# Patient Record
Sex: MALE | Race: WHITE | NOT HISPANIC OR LATINO | Employment: PART TIME | URBAN - METROPOLITAN AREA
[De-identification: names, ages, dates, MRNs, and addresses within clinical notes are randomized per-mention and may not be internally consistent; named-entity substitution may affect disease eponyms.]

---

## 2023-07-01 ENCOUNTER — HOSPITAL ENCOUNTER (EMERGENCY)
Facility: CLINIC | Age: 18
Discharge: HOME | End: 2023-07-02
Attending: EMERGENCY MEDICINE | Admitting: EMERGENCY MEDICINE
Payer: COMMERCIAL

## 2023-07-01 DIAGNOSIS — T59.91XA INHALATION OF NOXIOUS FUMES, ACCIDENTAL OR UNINTENTIONAL, INITIAL ENCOUNTER: ICD-10-CM

## 2023-07-01 PROCEDURE — 99283 EMERGENCY DEPT VISIT LOW MDM: CPT

## 2023-07-01 PROCEDURE — 36415 COLL VENOUS BLD VENIPUNCTURE: CPT | Performed by: EMERGENCY MEDICINE

## 2023-07-01 PROCEDURE — 82375 ASSAY CARBOXYHB QUANT: CPT | Performed by: EMERGENCY MEDICINE

## 2023-07-02 VITALS
RESPIRATION RATE: 16 BRPM | TEMPERATURE: 99.3 F | OXYGEN SATURATION: 98 % | WEIGHT: 172 LBS | SYSTOLIC BLOOD PRESSURE: 111 MMHG | DIASTOLIC BLOOD PRESSURE: 69 MMHG | HEART RATE: 75 BPM

## 2023-07-02 LAB
COHGB MFR BLD: 1.1 % (ref 0–2)
HOLD SPECIMEN: NORMAL
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
## 2023-07-02 NOTE — ED NOTES
Patient walked with RN down the hallway with a steady, even gait. Patient denies dizziness or lightheadedness.

## 2023-07-02 NOTE — DISCHARGE INSTRUCTIONS
When to seek medical advice  Call your healthcare provider right away if any of these occur:   Headache, dizziness, or fainting  Confusion, drowsiness, or seizures  Cough with lots of mucus (sputum)  Fever of 100.4  F (38 C) or higher, or as directed by your provider  Call 911  Call 911 if either of these occur:   Shortness of breath or wheezing gets worse  Chest pain gets worse
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
## 2025-03-09 ENCOUNTER — HOSPITAL ENCOUNTER (EMERGENCY)
Facility: CLINIC | Age: 20
Discharge: HOME | End: 2025-03-09
Attending: EMERGENCY MEDICINE | Admitting: EMERGENCY MEDICINE
Payer: COMMERCIAL

## 2025-03-09 VITALS
HEART RATE: 82 BPM | SYSTOLIC BLOOD PRESSURE: 131 MMHG | WEIGHT: 170 LBS | OXYGEN SATURATION: 98 % | HEIGHT: 72 IN | RESPIRATION RATE: 18 BRPM | TEMPERATURE: 98 F | DIASTOLIC BLOOD PRESSURE: 51 MMHG | BODY MASS INDEX: 23.03 KG/M2

## 2025-03-09 DIAGNOSIS — Z65.8 INTERPERSONAL PROBLEM: ICD-10-CM

## 2025-03-09 PROBLEM — R45.88 NONSUICIDAL SELF-INJURY (H): Status: ACTIVE | Noted: 2025-03-09

## 2025-03-09 PROBLEM — Z63.8 FAMILY CONFLICT: Status: ACTIVE | Noted: 2025-03-09

## 2025-03-09 PROBLEM — F12.90 MARIJUANA USE: Status: ACTIVE | Noted: 2025-03-09

## 2025-03-09 PROCEDURE — 99283 EMERGENCY DEPT VISIT LOW MDM: CPT

## 2025-03-09 ASSESSMENT — ACTIVITIES OF DAILY LIVING (ADL)
ADLS_ACUITY_SCORE: 41

## 2025-03-09 ASSESSMENT — COLUMBIA-SUICIDE SEVERITY RATING SCALE - C-SSRS
2. HAVE YOU ACTUALLY HAD ANY THOUGHTS OF KILLING YOURSELF IN THE PAST MONTH?: NO
6. HAVE YOU EVER DONE ANYTHING, STARTED TO DO ANYTHING, OR PREPARED TO DO ANYTHING TO END YOUR LIFE?: NO
1. IN THE PAST MONTH, HAVE YOU WISHED YOU WERE DEAD OR WISHED YOU COULD GO TO SLEEP AND NOT WAKE UP?: NO

## 2025-03-09 NOTE — PLAN OF CARE
Ghanshyam Arenas  March 9, 2025  Plan of Care Hand-off Note     Patient Recommended Care Path: discharge    Clinical Substantiation:  After diagnostic assessment, chart review and collateral information, the recommendation of this writer is for discharge with safety plan.   Patient's current symptoms of anxiety and family conflict; can be managed effectively in an outpatient setting. At the time of discharge, patient did not present with any imminent risk factors that would indicate the need for inpatient psychiatric hospitalization. At this time mental health admission does not appear to be the most therapeutically beneficial intervention/ level of care for Ghanshyam Arenas.   Patient feels confident that he can engage with the referral made today for mental health support. Patient is educated on how to access urgent psychiatric care if their symptoms increase or worsen.     Ghanshyam was agreeable with the plan presented for discharge, and was able to engage in safety planning with this writer.    Goals for crisis stabilization:  Engage in Outpatient mental health support to develop more effective coping mechanisms and better manage family conflict.    Next steps for Care Team:  no next steps for [facility]    Treatment Objectives Addressed:  rapport building, safety planning, identifying an appropriate aftercare plan, assessing safety    Has a specific means been identified for suicidal.homicide actions: No    Patient coping skills attempted to reduce the crisis:  Patient is not able to identify any coping skills that he attempted to utilize to reduce the crisis.    Collateral contact information:  Sherif Arenas, (Mother)  [phone]    Legal Status: Voluntary/Patient has signed consent for treatment                            Reviewed court records: yes     Psychiatry Consult: no psychiatry order placed    LAUREN Arrieta, Psychotherapist  [facility] - Triage & Transition Services  Callback: [phone]

## 2025-03-09 NOTE — DISCHARGE INSTRUCTIONS
Transitional care clinic appointment as below.  Return as needed.      Patient Navigation Hub - Scheduled Appointment    You have been scheduled a telephone appointment with the Mental Health and Addiction Services Patient Navigation Hub. As a reminder, this is not an in-person appointment. A Navigator will contact you at your personal telephone number on 3/10 at 3PM. You can expect a 15-30 minute appointment. You will discuss programming options and be assisted in next steps. If you have any further questions or concerns, please contact the Navigation Hub at [phone]. Note: You will not be charged for this telephone appointment.    Our Navigators work to be your point-of-contact for trustworthy and compassionate care from Emergency Services to [facility] s Programmatic Care. We will provide resources and communication to help guide you into programmatic care, other internal resources (I.e., Transition Clinic), and/or community programs. Ultimately, our goal is to be the one-stop-shop of communication, coordination, and support for you.    Phone: [phone]  Email: [email or web address]  Fax: [phone]    [facility] Programmatic Care  The following is a list of our programming options that may fit your next steps:    Programs  Mental Health  Intensive Outpatient Program (IOP)  Partial Hospitalization Program (PHP)  Day Treatment  Substance Use Disorder  Intensive Outpatient Program  Outpatient Group  Mental Health & Substance Use Disorder  Co-Occurring Intensive Outpatient Program  Residential  Available Locations  [facility], [place], [place], [place]  Note: Specific program options vary by location.    Transition Clinic  After leaving Emergency Services, it may take up to 30 days to enter a program. Knowing support is important during this period of time, we offer an urgent model of mental health care via the Transition  Clinic. We encourage you to discuss this with your Navigator during your telephone appointment.    FAQ  What can I expect after leaving Emergency Services?  If not already, you will soon be contacted by a Navigator who will work with you to find a program that fits your needs. If you desire to enter one of our [facility] programs, you will enter our programmatic care intake where an assessment will likely be needed over telephone, virtually, or in-person. After this assessment, a Navigator will connect with you again to provide a handoff to the specific program for next steps.   Please note that it may take up to 30 days for you to begin a program. If an extended wait occurs, please consider services at the Transition Clinic.  What is programmatic care?  It is a highly structured and comprehensive approach designed to address mental health and substance use disorders.   Programmatic care is typically used when individuals have not responded well to less intensive treatments or when they require a higher level of intervention due to the severity of their condition. It can be found in various settings, such as an outpatient location, residential treatment facilities, or inpatient hospitals.  Each program varies in duration and weekly commitments. Ask a Navigator for more program details.
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
## 2025-03-09 NOTE — ED PROVIDER NOTES
Emergency Department Note      History of Present Illness     Chief Complaint   Psychiatric Evaluation      HPI   Ghanshyam Arenas is a 19 year old male who presents with his mother for a psychiatric evaluation.  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  He has had several instances where he gets into arguments with his parents.  He tells me he just wants the argument to end so he punches himself in the face and once this even caused him to chip his tooth.  Another time he dislocated his shoulder when gesticulating and this ultimately required surgery and inability to work. He denies suicidal ideation as well as homicidal ideation.  Today he got into another argument with his mother and she insisted he come to the emergency department for psychiatric evaluation.  He has used marijuana but his parents were drug testing him so he stopped.  He denies alcohol use and any other drug use.    His mother reports her concern is primarily his addiction to marijuana.  She reports he was sober after a treatment at [facility] but has recently relapsed.  She tells me he is not making good decisions because he is high and he is disrespectful to her, verbally abusing her.    Independent Historian   Mother as detailed above.    Review of External Notes   Internal medicine visit 2/10/2025 and pulmonology 2/13/2025 for asthma.  Orthopedics note 2/17/2025 for follow-up on right shoulder labrum repair 1/16/2025.  Nurse triage call today for shoulder pain.    Past Medical History     Medical History and Problem List   Asthma    Medications   Advair    Surgical History   Right shoulder labrum repair 1/16/2025    Physical Exam     Patient Vitals for the past 24 hrs:   BP Temp Temp src Pulse Resp SpO2 Height Weight   03/09/25 1458 131/51 98  F (36.7  C) Oral 82 18 98 % -- --   03/09/25 1427 -- 98.7  F (37.1  C) Temporal -- -- -- -- --  "  03/09/25 1425 115/53 -- -- 81 18 98 % 1.829 m (6') 77.1 kg (170 lb)     Physical Exam  General: Well-developed and well-nourished. Well appearing young man. Cooperative.  Head:  Atraumatic.  Eyes:  Conjunctivae, lids, and sclerae are normal.  ENT:    Normal nose. Moist mucous membranes.  Neck:  Supple. Normal range of motion.  CV:  Well-perfused.  Resp:  No respiratory distress.   GI:  Non-distended.    Skin:  Warm. Non-diaphoretic. No pallor.  Neuro:  Awake. A&Ox3. Normal strength.  Psych: Normal mood and affect.  Stuttering speech.  No suicidal or homicidal thought content.  Not responding to internal stimuli.  Vitals reviewed.    Diagnostics     Not applicable    ED Course    ED Course   ED Course as of 03/09/25 1744   Sun Mar 09, 2025   1654 I spoke with TERRENCE Ramos, after her assessment of the patient.    1712 I went to update the patient, but he had left.  His mom called him and put him on speaker phone.  He understands plan for appointment and is willing to let his mother take the discharge paperwork.     Additional Documentation  Social Determinants of Health: Employment/Unemployment(unemployed), Financial Insecurity(decreased income and recent loss of savings), Transportation(uses mother's car when she will allow), and Social Connections/Isolation(strained relationship with his parents)    Medical Decision Making / Diagnosis   MEG Morrissey is a 19 year old man who was brought in by his mother for a psychiatric evaluation.  Ghanshyam reports frequent arguments with his parents.  It sounds like this is typically over marijuana use.  He denies suicidal ideation, alcohol use, and any other drug use.  His mother is concerned given his \"relapse\" with marijuana use.  She tells me he is not making good decisions because he is high and he is disrespectful to her.  The patient is well-appearing on exam.  He has no suicidal thought content.  He does not appear to be intoxicated or in withdrawal.  There is no evidence of " psychosis.  He is here voluntarily.  The patient was seen by DEC who recommends discharge.  I agree with this completely and the patient is agreeable to a outpatient therapy appointment with the transitional care clinic.  His mother was updated on this plan as well.  He will return as needed.  Questions answered.    Disposition   The patient was discharged.     Diagnosis     ICD-10-CM    1. Interpersonal problem  Z65.8            Discharge Medications   There are no discharge medications for this patient.       Sarah Dasilva MD  03/11/25 7673

## 2025-03-09 NOTE — ED TRIAGE NOTES
Pt arrived to triage with mother. Mother reports concerns that pt is struggling with his mental health. Pt notes that since moving out of the house. 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 Pt notes having a severe stutter that causes him to want to work away from people and has found work over night. Pt notes that he and mother have been arguing more. In one of these arguments he dislocated his arm when waving his arms. Pt then had surgery in Jan for this which caused him to stop working and caused further financial hardship. Pt denies SI and denies HI.

## 2025-03-10 PROBLEM — Z78.9 KNOWN HEALTH PROBLEMS: NONE: Status: ACTIVE | Noted: 2025-03-10

## 2025-04-06 ENCOUNTER — HOSPITAL ENCOUNTER (EMERGENCY)
Facility: CLINIC | Age: 20
Discharge: HOME | End: 2025-04-07
Attending: EMERGENCY MEDICINE | Admitting: EMERGENCY MEDICINE
Payer: COMMERCIAL

## 2025-04-06 VITALS
BODY MASS INDEX: 24.5 KG/M2 | HEIGHT: 71 IN | DIASTOLIC BLOOD PRESSURE: 74 MMHG | WEIGHT: 175 LBS | TEMPERATURE: 98.3 F | OXYGEN SATURATION: 98 % | SYSTOLIC BLOOD PRESSURE: 116 MMHG | HEART RATE: 78 BPM | RESPIRATION RATE: 18 BRPM

## 2025-04-06 DIAGNOSIS — R09.A2 GLOBUS SENSATION: Primary | ICD-10-CM

## 2025-04-06 PROCEDURE — 99283 EMERGENCY DEPT VISIT LOW MDM: CPT | Mod: 25

## 2025-04-06 PROCEDURE — 31575 DIAGNOSTIC LARYNGOSCOPY: CPT

## 2025-04-06 ASSESSMENT — ACTIVITIES OF DAILY LIVING (ADL)
ADLS_ACUITY_SCORE: 41
ADLS_ACUITY_SCORE: 41

## 2025-04-06 ASSESSMENT — COLUMBIA-SUICIDE SEVERITY RATING SCALE - C-SSRS
6. HAVE YOU EVER DONE ANYTHING, STARTED TO DO ANYTHING, OR PREPARED TO DO ANYTHING TO END YOUR LIFE?: NO
1. IN THE PAST MONTH, HAVE YOU WISHED YOU WERE DEAD OR WISHED YOU COULD GO TO SLEEP AND NOT WAKE UP?: NO
2. HAVE YOU ACTUALLY HAD ANY THOUGHTS OF KILLING YOURSELF IN THE PAST MONTH?: NO

## 2025-04-07 PROCEDURE — 250N000013 HC RX MED GY IP 250 OP 250 PS 637: Performed by: EMERGENCY MEDICINE

## 2025-04-07 RX ADMIN — ANTACID/ANTIFLATULENT 4 G: 380; 550; 10; 10 GRANULE, EFFERVESCENT ORAL at 00:03

## 2025-04-07 ASSESSMENT — ACTIVITIES OF DAILY LIVING (ADL)
ADLS_ACUITY_SCORE: 41
ADLS_ACUITY_SCORE: 41

## 2025-04-07 NOTE — ED TRIAGE NOTES
Patient was eating around [id number], states felt like a Polish miller became lodged in L side of throat. Tried washing down drinking water but sensation persists. Speech clear. Airway patent, no stridor. Patient is managing secretions and has been sipping water. But sensation persists.      Triage Assessment (Adult)       Row Name 04/06/25 4245          Triage Assessment    Airway WDL WDL        Respiratory WDL    Respiratory WDL WDL        Skin Circulation/Temperature WDL    Skin Circulation/Temperature WDL WDL        Cardiac WDL    Cardiac WDL WDL        Peripheral/Neurovascular WDL    Peripheral Neurovascular WDL WDL        Cognitive/Neuro/Behavioral WDL    Cognitive/Neuro/Behavioral WDL WDL

## 2025-04-07 NOTE — DISCHARGE INSTRUCTIONS
Please follow-up with your primary care provider and/or specialist regarding your visit to the ER today.    Please return to the emergency department should you experience any of the symptoms we specifically discussed, including but not limited to recurrence or worsening of your symptoms, or development of any new and concerning symptoms such as fever, neck swelling, neck pain, difficulty with moving your neck, cough, shortness of breath, or inability to keep down food or fluids.    Please use medication as needed.    Please follow-up with ENT if your symptoms do not resolve.

## 2025-04-07 NOTE — ED PROVIDER NOTES
"  Emergency Department Note      History of Present Illness     Chief Complaint   Swallowed Foreign Body      HPI   Ghanshyam Arenas is a 19 year old male who presents for a swallowed foreign body. Around 1300 the patient was eating when it felt like a [employer] [place] miller became lodged in the left side of his throat. He has tried drinking eating more food and drink water to wash it down but feels like something is still there. He has been able to keep down food and fluids without difficulty. No vomiting, cough, chest pain. Patient denies having foreign body sensation in his throat prior to swallowing the Citizen [place] miller.    Independent Historian   None    Review of External Notes   I reviewed a 4/1/2025 for a lump and breathing problems.    Past Medical History     Medical History and Problem List   Asthma   Marijuana use     Medications   Albuterol     Surgical History   Orthopedic surgery   Van teeth extraction     Physical Exam     Patient Vitals for the past 24 hrs:   BP Temp Temp src Pulse Resp SpO2 Height Weight   04/06/25 2241 116/74 -- -- -- -- -- -- --   04/06/25 2239 -- 98.3  F (36.8  C) Oral 78 18 98 % 1.803 m (5' 11\") 79.4 kg (175 lb)     Physical Exam  Constitutional:       Appearance: Normal appearance.   HENT:      Head: Normocephalic and atraumatic.      Neck: No mass on palpation.  Midline trachea.  No stridor.  Eyes:      Extraocular Movements: Extraocular movements intact.      Conjunctiva/sclera: Conjunctivae normal.   Cardiovascular:      Rate and Rhythm: Normal rate and regular rhythm.   Pulmonary:      Effort: Pulmonary effort is normal. No respiratory distress.      Breath sounds: Clear to auscultation bilaterally.  No wheezing.  No crackles.  No stridor.  Abdominal:      General: Abdomen is flat. There is no distension.      Palpations: Abdomen is soft.      Tenderness: There is no abdominal tenderness.   Musculoskeletal:      Cervical back: Normal range of motion. No rigidity.       " Physical Therapy    Visit Type: initial evaluation and treatment    Relevant History/Co-morbidities: 7/30/23: 78 yo M admitted due to SOB > 12    SUBJECTIVE  Patient agreed to participate in therapy this date.  Patient verbally agrees to allow the following to be present during session: spouse  Patient reports he had dialysis today, but it is not a usual dialysis day for him.  Patient / Family Goal: return home    Pain   Patient reports pain is not an issue/concern.     OBJECTIVE     Cognitive Status   Affect/Behavior    - calm and cooperative  Orientation    - Oriented to: person, place, time and situation  Functional Communication   - Overall Status: within functional limits     Range of Motion (ROM)   (degrees unless noted; active unless noted; norms in ( ); negative=lacking to 0, positive=beyond 0)  WFL: LUE, RUE  WFL: LLE, RLE    Strength  (out of 5 unless noted, standard test position unless noted)   WFL: LUE, RUE  WFL: LLE, RLE      Sitting Balance  (MELLISSA = base of support)  Static      - Trial 1 details: supervision    Standing Balance  (MELLISSA = base of support)  Firm Surface: Double Leg      - Static, Eyes Open       - Trial 1 details: supervision and with double UE support       Bed Mobility  - Supine to sit: supervision, with verbal cues, with tactile cues  Transfers  Assistive devices: gait belt, 2-wheeled walker, none  - Sit to stand: supervision, with verbal cues  - Stand to sit: supervision, with verbal cues    Ambulation / Gait  - Assistive device: gait belt and two-wheeled walker  - Distance (feet unless otherwise indicated): 50  - Assist Level: supervision and with verbal cues  - Surface: even  - Description: decreased teresa/pace and step through    Stair Ambulation  - Number of steps: 3;   - Assist Level: with verbal cues and supervision  - Rails: right rail only  - Pattern: step to  Second Trial  Cues for safety with stair climbing.       Interventions     Training provided: activity tolerance,  Right lower leg: No edema.      Left lower leg: No edema.   Skin:     General: Skin is warm and dry.   Neurological:      General: No focal deficit present.      Mental Status: Alert and oriented to person, place, and time.   Psychiatric:         Mood and Affect: Mood normal.         Behavior: Behavior normal.    Diagnostics     Lab Results   Labs Ordered and Resulted from Time of ED Arrival to Time of ED Departure - No data to display    Imaging   No orders to display       EKG   None    Independent Interpretation   See laryngoscopy note    ED Course      Medications Administered   Medications   sod bicarbonate-citric acid-simethicone (EZ GAS) 2.21-1.53-0.04 g packet 4 g (4 g Oral $Given 4/7/25 0003)       Procedures   Procedures      Laryngoscopy     Procedure: Flexible Laryngoscopy     Indication: Throat foreign body sensation/foreign body suspected    Consent: Verbal    Pretreatment: Nasal: Oxymetazoline 0.05% and Lidocaine 4% w/o epinephrine and Oral: Atomized lidocaine 4%     Procedure Detail: After pretreatment of right naris, nasopharynx, and oropharynx, the flexible scope was gently advanced to the level of the vocal cords with the below findings. The flexible scope was then gently retracted and removed.            Findings: Foreign body seen/not seen(NOT SEEN), Epiglottis normal/abnormal (NORMAL), Vocal cords normal/abnormal(NORMAL), and Supraglottic tissues normal/abnormal(MILD COBBLESTONING/IRRITATION. NO EDEMA)    Patient Status: The patient tolerated the procedure well: Yes. There were no complications.      Discussion of Management   See ED course    ED Course   ED Course as of 04/07/25 0704   Mon Apr 07, 2025   0108 I initially assessed the patient and obtained the above history and physical exam.    0136 I rechecked the patient      Additional Documentation  None    Medical Decision Making / Diagnosis     CMS Diagnoses: None    MIPS       None    [facility]   Ghanshyam Arenas is a 19 year old male as  balance retraining, bed mobility training, transfer training, gait training, HEP training, safety training and positioning  Patient's spouse present and observing during session.  Spouse demonstrating ability to assist/supervise with mobility.  Skilled input: Verbal instruction/cues and tactile instruction/cues  Verbal Consent: Writer verbally educated and received verbal consent for hand placement, positioning of patient, and techniques to be performed today from patient for therapist position for techniques, clothing adjustments for techniques and hand placement and palpation for techniques as described above and how they are pertinent to the patient's plan of care.         Education:   - Present and ready to learn: patient  Education provided during session:  - Results of above outlined education: Verbalizes understanding and Demonstrates understanding    ASSESSMENT   Patient will benefit from skilled therapy to address listed impairments and functional limitations.    Summary of function and discharge needs based on today's assessment:   - Current level of function: slightly below baseline level of function   - Therapy needs at discharge: therapy 1-3 times per week   - Activities of daily living (ADLs) requiring support at discharge: transfers, ambulation and stairs   - Impairments that require further therapy intervention: balance and activity tolerance    Patient is performing mobility at supervision level with use of rolling walker and appears to be close to baseline level of function.  Patient's spouse present and demonstrating ability to assist patient.    AM-PAC  - Generalized Prior Level of Function: IND/MOD I ([facility] 22-24)       Key: MOD A=moderate assistance, IND/MOD I=independent/modified independent  - Generalized Current Level of Function     - Current Mobility Score: 20       AM-PAC Scoring Key= >21 Modified Independent; 20-21 Supervision; 18-19 Minimal assist; 13-18 Moderate assist; 9-12 Max  assist; <9 Total assist    Therapy Diagnosis:  Other Abnormalities of Gait and Mobility        • Predicted patient presentation: Low (stable) - Patient comorbidities and complexities, as defined above, will have little effect on progress for prescribed plan of care.    PLAN   Suggestions for next session as indicated: PT Frequency: 1-2 x per week      PT/OT Mobility Equipment for Discharge: Owns RW and cane    A minimum of 8 minutes per session x 1 week in the acute setting.   Interventions: balance, gait training, ROM, strengthening, HEP train/position, continued evaluation, bed mobility, safety education, functional transfer training, endurance training and stairs retraining  Agreement to plan and goals: patient agrees with goals and treatment plan        GOALS  Long Term Goals: (to be met by time of discharge from hospital)  Sit to supine: Patient will complete sit to supine modified independent.  Supine to sit: Patient will complete supine to sit modified independent.  Sit to stand: Patient will complete sit to stand transfer with 2-wheeled walker, modified independent.   Stand to sit: Patient will complete stand to sit transfer with 2-wheeled walker, modified independent.   Ambulation (even): Patient will ambulate on even surface for 150 feet with 2-wheeled walker, modified independent.   3-4 steps: Patient will ambulate 3-4 steps with using one rail, supervision.     Documented in the chart in the following areas: Assessment/Plan.        Therapy procedure time and total treatment time can be found documented on the Time Entry flowsheet   described above presents to the emergency department for foreign body sensation of throat after eating a French miller and is concerned that a French miller may be stuck in his throat.  Patient is protecting airway at this time.  No difficulty with handling secretions or eating and drinking.  Fluid and food has been able to successfully pass since the event.  Nonetheless, patient still continues to endorse a foreign body sensation to the left side of his throat.  Suspicion is that there is probably some mucosal irritation contributing to globus type sensation.  Verbal consent was obtained to perform a bedside flexible laryngoscopy.  Thankfully, no obvious foreign body was seen on examination.  Patient does have some cobblestoning in the anterior mucosal wall suggestive of mild irritation.  Will discharge patient with Magic mouthwash as needed for throat irritation symptoms and follow-up with ENT outpatient as needed.  At this time, there is no indication for advanced imaging of the neck especially given organic food material unlikely to show up on imaging.  Discussed care plan with patient who voiced understanding and agreement with plan.  Discussed return precautions.  Answered all questions.  Additional workup and orders as listed in chart.    Disposition   The patient was discharged.     Diagnosis     ICD-10-CM    1. Globus sensation  R09.A2            Discharge Medications   Discharge Medication List as of 4/7/2025  2:03 AM        START taking these medications    Details   magic mouthwash suspension (diphenhydrAMINE, lidocaine, aluminum-magnesium & simethicone) Swish and swallow 10 mLs in mouth every 6 hours as needed for sore throat., Disp-120 mL, R-0, E-Prescribe           Scribe Disclosure:  Harvey ULLOA, am serving as a scribe at 1:03 AM on 4/7/2025 to document services personally performed by Abdon Gerardo DO based on my observations and the provider's statements to me.        Abdon Gerardo DO  04/07/25  0743

## 2025-07-21 ENCOUNTER — HOSPITAL ENCOUNTER (EMERGENCY)
Facility: CLINIC | Age: 20
Discharge: LEFT BEFORE DISCHARGE | End: 2025-07-21
Admitting: EMERGENCY MEDICINE
Payer: COMMERCIAL

## 2025-07-21 VITALS
DIASTOLIC BLOOD PRESSURE: 78 MMHG | BODY MASS INDEX: 22.35 KG/M2 | SYSTOLIC BLOOD PRESSURE: 134 MMHG | HEIGHT: 72 IN | RESPIRATION RATE: 16 BRPM | OXYGEN SATURATION: 98 % | HEART RATE: 75 BPM | WEIGHT: 165 LBS | TEMPERATURE: 98 F

## 2025-07-21 PROCEDURE — 99281 EMR DPT VST MAYX REQ PHY/QHP: CPT

## 2025-07-21 ASSESSMENT — ACTIVITIES OF DAILY LIVING (ADL): ADLS_ACUITY_SCORE: 41

## 2025-07-21 ASSESSMENT — COLUMBIA-SUICIDE SEVERITY RATING SCALE - C-SSRS
1. IN THE PAST MONTH, HAVE YOU WISHED YOU WERE DEAD OR WISHED YOU COULD GO TO SLEEP AND NOT WAKE UP?: NO
2. HAVE YOU ACTUALLY HAD ANY THOUGHTS OF KILLING YOURSELF IN THE PAST MONTH?: NO
6. HAVE YOU EVER DONE ANYTHING, STARTED TO DO ANYTHING, OR PREPARED TO DO ANYTHING TO END YOUR LIFE?: NO

## 2025-07-21 NOTE — ED TRIAGE NOTES
Patient had french fries stuck in throat months ago, since dealing with throat irritation. CT done a while back done showed irritation and inflammation, here today because he wants to have another CT for evaluation, Reports having some marijuana eatable and been feeling worse. Reports ENT appointment is schedule on Thursday.         Triage Assessment (Adult)       Row Name 07/21/25 1256          Triage Assessment    Airway WDL WDL        Respiratory WDL    Respiratory WDL WDL        Skin Circulation/Temperature WDL    Skin Circulation/Temperature WDL WDL        Cardiac WDL    Cardiac WDL WDL        Peripheral/Neurovascular WDL    Peripheral Neurovascular WDL WDL        Cognitive/Neuro/Behavioral WDL    Cognitive/Neuro/Behavioral WDL WDL